# Patient Record
Sex: MALE | NOT HISPANIC OR LATINO | Employment: FULL TIME | URBAN - METROPOLITAN AREA
[De-identification: names, ages, dates, MRNs, and addresses within clinical notes are randomized per-mention and may not be internally consistent; named-entity substitution may affect disease eponyms.]

---

## 2024-05-20 ENCOUNTER — HOSPITAL ENCOUNTER (OUTPATIENT)
Dept: RADIOLOGY | Facility: HOSPITAL | Age: 69
Discharge: HOME | End: 2024-05-20
Payer: COMMERCIAL

## 2024-05-20 VITALS — WEIGHT: 210.1 LBS

## 2024-05-20 DIAGNOSIS — R97.20 ELEVATED PROSTATE SPECIFIC ANTIGEN (PSA): ICD-10-CM

## 2024-05-20 PROCEDURE — A9575 INJ GADOTERATE MEGLUMI 0.1ML: HCPCS | Performed by: UROLOGY

## 2024-05-20 PROCEDURE — 72197 MRI PELVIS W/O & W/DYE: CPT

## 2024-05-20 PROCEDURE — 2550000001 HC RX 255 CONTRASTS: Performed by: UROLOGY

## 2024-05-20 PROCEDURE — 72197 MRI PELVIS W/O & W/DYE: CPT | Performed by: RADIOLOGY

## 2024-05-20 RX ORDER — GADOTERATE MEGLUMINE 376.9 MG/ML
19 INJECTION INTRAVENOUS
Status: COMPLETED | OUTPATIENT
Start: 2024-05-20 | End: 2024-05-20

## 2024-05-20 RX ADMIN — GADOTERATE MEGLUMINE 19 ML: 376.9 INJECTION INTRAVENOUS at 11:39

## 2024-06-17 ENCOUNTER — LAB (OUTPATIENT)
Dept: LAB | Facility: LAB | Age: 69
End: 2024-06-17
Payer: COMMERCIAL

## 2024-06-17 DIAGNOSIS — D64.9 ANEMIA, UNSPECIFIED: Primary | ICD-10-CM

## 2024-06-17 LAB
FERRITIN SERPL-MCNC: 24 NG/ML (ref 20–300)
IRON SATN MFR SERPL: 18 % (ref 25–45)
IRON SERPL-MCNC: 62 UG/DL (ref 35–150)
TIBC SERPL-MCNC: 343 UG/DL (ref 240–445)
UIBC SERPL-MCNC: 281 UG/DL (ref 110–370)

## 2024-06-17 PROCEDURE — 82728 ASSAY OF FERRITIN: CPT

## 2024-06-17 PROCEDURE — 83550 IRON BINDING TEST: CPT

## 2024-06-17 PROCEDURE — 36415 COLL VENOUS BLD VENIPUNCTURE: CPT

## 2024-06-17 PROCEDURE — 83540 ASSAY OF IRON: CPT

## 2024-08-08 ENCOUNTER — APPOINTMENT (OUTPATIENT)
Dept: UROLOGY | Facility: CLINIC | Age: 69
End: 2024-08-08
Payer: COMMERCIAL

## 2024-08-08 VITALS — HEIGHT: 68 IN | BODY MASS INDEX: 30.31 KG/M2 | TEMPERATURE: 96.6 F | WEIGHT: 200 LBS

## 2024-08-08 DIAGNOSIS — R39.12 BENIGN PROSTATIC HYPERPLASIA WITH WEAK URINARY STREAM: ICD-10-CM

## 2024-08-08 DIAGNOSIS — R97.20 ELEVATED PSA: ICD-10-CM

## 2024-08-08 DIAGNOSIS — R33.9 URINARY RETENTION: ICD-10-CM

## 2024-08-08 DIAGNOSIS — N40.1 BENIGN PROSTATIC HYPERPLASIA WITH WEAK URINARY STREAM: ICD-10-CM

## 2024-08-08 DIAGNOSIS — Z79.2 PROPHYLACTIC ANTIBIOTIC: ICD-10-CM

## 2024-08-08 DIAGNOSIS — N39.0 URINARY TRACT INFECTION WITHOUT HEMATURIA, SITE UNSPECIFIED: Primary | ICD-10-CM

## 2024-08-08 LAB
POC APPEARANCE, URINE: CLEAR
POC BILIRUBIN, URINE: NEGATIVE
POC BLOOD, URINE: NEGATIVE
POC COLOR, URINE: YELLOW
POC GLUCOSE, URINE: NEGATIVE MG/DL
POC KETONES, URINE: NEGATIVE MG/DL
POC LEUKOCYTES, URINE: ABNORMAL
POC NITRITE,URINE: NEGATIVE
POC PH, URINE: 6 PH
POC PROTEIN, URINE: NEGATIVE MG/DL
POC SPECIFIC GRAVITY, URINE: 1.02
POC UROBILINOGEN, URINE: 0.2 EU/DL

## 2024-08-08 PROCEDURE — 1126F AMNT PAIN NOTED NONE PRSNT: CPT | Performed by: UROLOGY

## 2024-08-08 PROCEDURE — G2211 COMPLEX E/M VISIT ADD ON: HCPCS | Performed by: UROLOGY

## 2024-08-08 PROCEDURE — 99214 OFFICE O/P EST MOD 30 MIN: CPT | Performed by: UROLOGY

## 2024-08-08 PROCEDURE — 1159F MED LIST DOCD IN RCRD: CPT | Performed by: UROLOGY

## 2024-08-08 PROCEDURE — 81002 URINALYSIS NONAUTO W/O SCOPE: CPT | Performed by: UROLOGY

## 2024-08-08 PROCEDURE — 3008F BODY MASS INDEX DOCD: CPT | Performed by: UROLOGY

## 2024-08-08 PROCEDURE — 76857 US EXAM PELVIC LIMITED: CPT | Performed by: UROLOGY

## 2024-08-08 RX ORDER — IBUPROFEN 100 MG/5ML
1000 SUSPENSION, ORAL (FINAL DOSE FORM) ORAL
COMMUNITY

## 2024-08-08 RX ORDER — SILDENAFIL 50 MG/1
TABLET, FILM COATED ORAL
COMMUNITY

## 2024-08-08 RX ORDER — CHOLECALCIFEROL (VITAMIN D3) 25 MCG
1 TABLET ORAL DAILY
COMMUNITY
Start: 2023-06-11

## 2024-08-08 ASSESSMENT — PAIN SCALES - GENERAL: PAINLEVEL: 0-NO PAIN

## 2024-08-08 NOTE — PROGRESS NOTES
"  Patient is a 69 y.o. male with a history of an elevated PSA  Chief Complaint    Follow-up; prostate biopsy        Referring physician: No ref. provider found     SUBJECTIVE:  HPI   His PSA in 5/2024 was 4.81.  MRI prostate in 5/2024 was negative.    No results found for: \"KPSAT\", \"KPSAP\"  No results found for: \"UAMICCOMM\"  No results found for: \"TR1\"  No results found for: \"URINECULTURE\"     Past Medical History:   Diagnosis Date    Cramp and spasm 06/02/2014    Muscle cramps    Other conditions influencing health status     Fracture Of The Lower Jaw (Closed)    Personal history of diseases of the skin and subcutaneous tissue 01/05/2015    History of urticaria    Personal history of other diseases of the musculoskeletal system and connective tissue 11/12/2013    History of tendinitis    Personal history of other diseases of the respiratory system     History of bronchitis      Past Surgical History:   Procedure Laterality Date    APPENDECTOMY  11/12/2013    Appendectomy    COLONOSCOPY  10/29/2015    Complete Colonoscopy      No family history on file.   Social History     Socioeconomic History    Marital status:    Tobacco Use    Smoking status: Never    Smokeless tobacco: Never     Social Determinants of Health     Financial Resource Strain: Not on File (3/6/2021)    Received from [facility]    Financial Resource Strain     Financial Resource Strain: 0   Food Insecurity: Not on File (3/6/2021)    Received from [facility]    Food Insecurity     Food: 0   Transportation Needs: Not on File (3/6/2021)    Received from [facility]    Transportation Needs     Transportation: 0   Physical Activity: Not on File (3/6/2021)    Received from [facility]    Physical Activity     Physical Activity: 0   Stress: Not on File (3/6/2021)    Received from [facility]    Stress     Stress: 0   Social Connections: Not on File (3/6/2021)    Received from [facility]    Social Connections     Social Connections and " Isolation: 0   Housing Stability: Not on File (3/6/2021)    Received from [facility]    Housing Stability     Housin        Review of Systems   Constitutional: denies any unintentional weight loss or change in strength.  Integumentary: denies any rashes or pruritus.  Eyes: denies any double vision or eye pain.  Ear/Nose/Mouth/Throat: denies any nosebleeds or gum bleeds.  Cardiovascular: denies any chest pain or syncope.  Respiratory: denies hemoptysis.  Gastrointestinal: denies nausea or vomiting.  Musculoskeletal: denies muscle cramping or weakness.  Neurologic: denies convulsions or seizures.  Hematologic/Lymphatic: denies bleeding tendencies.  Endocrine: denies heat/cold intolerance.  All other systems have been reviewed and are negative unless otherwise noted in the HPI.    OBJECTIVE:  Visit Vitals  Temp 35.9 °C (96.6 °F)     Physical Exam   Constitutional: No obvious distress.  Eyes: Non-injected conjunctiva, sclera clear, EOMI.  Ears/Nose/Mouth/Throat: No obvious drainage per ears or nose.  Cardiovascular: Extremities are warm and well perfused. No edema, cyanosis or pallor.  Respiratory: No audible wheezing/stridor; respirations do not appear labored.  Gastrointestinal: Abdomen soft, not distended.  Musculoskeletal: Normal ROM of extremities.  Skin: No obvious rashes or open sores.  Neurologic: Alert and oriented, CN 2-12 grossly intact.  Psychiatric: Answers questions appropriately with normal affect.  Hematologic/Lymphatic/Immunologic: No obvious bruises or sites of spontaneous bleeding.  Genitourinary: No CVA tenderness, bladder not palpable.     Labs:  Lab Results   Component Value Date    WBC 7.7 10/21/2022    HGB 14.2 10/21/2022    HCT 41.0 10/21/2022     10/21/2022    CHOL 210 (H) 2018    TRIG 91 2018    HDL 48 2018    ALT 23 10/21/2022    AST 32 10/21/2022     10/21/2022    K 3.7 10/21/2022     10/21/2022    CREATININE 1.58 (H) 10/21/2022    BUN 18 10/21/2022  "   CO2 28 10/21/2022    TSH 3.28 12/28/2018    HGBA1C 6.5 (H) 05/14/2024     No results found for: \"KPSAT\", \"KPSAP\"  IMAGING:        PROCEDURES:    Bladder scan performed with ultrasound imaging.  Post void residual 65 cc      ASSESSMENT/PLAN:  Problem List Items Addressed This Visit       BPH (benign prostatic hyperplasia)    Elevated PSA    Urinary retention     Other Visit Diagnoses       Urinary tract infection without hematuria, site unspecified    -  Primary    Relevant Orders    POCT UA (nonautomated) manually resulted (Completed)    Measure post void residual (Completed)    Prophylactic antibiotic        Relevant Medications    levoFLOXacin (Levaquin) 500 mg tablet           This history of an elevated PSA.  We reviewed his negative MRI and prior PSA level.  He elected for a prostate biopsy and will be scheduled.  He will  an enema and was prescribed Levaquin to start prior to procedure.    He has a history of BPH.  He is voiding will be monitored.    He has mild urinary retention and has a prior history of retention.  His postvoid residual was 65 cc today.  This will be followed with bladder scan.    All questions were answered to the patient’s satisfaction.  Patient agrees with the plan and wishes to proceed.  Follow-up will be scheduled appropriately.     Jayjay Carvajal MD  "

## 2024-08-11 DIAGNOSIS — R82.81 PYURIA: Primary | ICD-10-CM

## 2024-08-11 PROBLEM — N40.0 BPH (BENIGN PROSTATIC HYPERPLASIA): Status: ACTIVE | Noted: 2024-08-11

## 2024-08-11 PROBLEM — R97.20 ELEVATED PSA: Status: ACTIVE | Noted: 2024-08-11

## 2024-08-11 PROBLEM — R33.9 URINARY RETENTION: Status: ACTIVE | Noted: 2024-08-11

## 2024-08-11 RX ORDER — LEVOFLOXACIN 500 MG/1
500 TABLET, FILM COATED ORAL DAILY
Qty: 2 TABLET | Refills: 0 | Status: SHIPPED | OUTPATIENT
Start: 2024-08-11 | End: 2024-08-13

## 2024-08-19 ENCOUNTER — LAB (OUTPATIENT)
Dept: LAB | Facility: LAB | Age: 69
End: 2024-08-19
Payer: COMMERCIAL

## 2024-08-19 DIAGNOSIS — R82.81 PYURIA: ICD-10-CM

## 2024-08-19 PROCEDURE — 87086 URINE CULTURE/COLONY COUNT: CPT

## 2024-08-19 PROCEDURE — 87181 SC STD AGAR DILUTION PER AGT: CPT

## 2024-08-19 PROCEDURE — 81001 URINALYSIS AUTO W/SCOPE: CPT

## 2024-08-20 LAB
APPEARANCE UR: ABNORMAL
BILIRUB UR STRIP.AUTO-MCNC: NEGATIVE MG/DL
COLOR UR: ABNORMAL
GLUCOSE UR STRIP.AUTO-MCNC: NORMAL MG/DL
HOLD SPECIMEN: NORMAL
KETONES UR STRIP.AUTO-MCNC: NEGATIVE MG/DL
LEUKOCYTE ESTERASE UR QL STRIP.AUTO: ABNORMAL
MUCOUS THREADS #/AREA URNS AUTO: ABNORMAL /LPF
NITRITE UR QL STRIP.AUTO: NEGATIVE
PH UR STRIP.AUTO: 5.5 [PH]
PROT UR STRIP.AUTO-MCNC: ABNORMAL MG/DL
RBC # UR STRIP.AUTO: NEGATIVE /UL
RBC #/AREA URNS AUTO: ABNORMAL /HPF
SP GR UR STRIP.AUTO: 1.02
SQUAMOUS #/AREA URNS AUTO: ABNORMAL /HPF
UROBILINOGEN UR STRIP.AUTO-MCNC: NORMAL MG/DL
WBC #/AREA URNS AUTO: >50 /HPF

## 2024-08-22 ENCOUNTER — APPOINTMENT (OUTPATIENT)
Dept: UROLOGY | Facility: CLINIC | Age: 69
End: 2024-08-22
Payer: COMMERCIAL

## 2024-08-22 VITALS — HEIGHT: 68 IN | BODY MASS INDEX: 30.46 KG/M2 | WEIGHT: 201 LBS | TEMPERATURE: 97 F

## 2024-08-22 DIAGNOSIS — N39.0 URINARY TRACT INFECTION WITHOUT HEMATURIA, SITE UNSPECIFIED: ICD-10-CM

## 2024-08-22 DIAGNOSIS — N30.00 ACUTE CYSTITIS WITHOUT HEMATURIA: Primary | ICD-10-CM

## 2024-08-22 DIAGNOSIS — R97.20 ELEVATED PSA: ICD-10-CM

## 2024-08-22 LAB — BACTERIA UR CULT: ABNORMAL

## 2024-08-22 PROCEDURE — 99213 OFFICE O/P EST LOW 20 MIN: CPT | Performed by: UROLOGY

## 2024-08-22 PROCEDURE — G2211 COMPLEX E/M VISIT ADD ON: HCPCS | Performed by: UROLOGY

## 2024-08-22 RX ORDER — LEVOFLOXACIN 500 MG/1
500 TABLET, FILM COATED ORAL DAILY
Qty: 9 TABLET | Refills: 0 | Status: SHIPPED | OUTPATIENT
Start: 2024-08-22 | End: 2024-09-01

## 2024-08-22 ASSESSMENT — PAIN SCALES - GENERAL: PAINLEVEL: 0-NO PAIN

## 2024-08-22 NOTE — PROGRESS NOTES
Patient is a 69 y.o. male presenting with an elevated psa     SUBJECTIVE:  HPI   His urine culture was positive for a UTI.  He was scheduled for a prostate biopsy today.      Urine Culture (no units)   Date Value   2024 >100,000 Aerococcus urinae (A)        Past Medical History:   Diagnosis Date    Cramp and spasm 2014    Muscle cramps    Other conditions influencing health status     Fracture Of The Lower Jaw (Closed)    Personal history of diseases of the skin and subcutaneous tissue 2015    History of urticaria    Personal history of other diseases of the musculoskeletal system and connective tissue 2013    History of tendinitis    Personal history of other diseases of the respiratory system     History of bronchitis      Past Surgical History:   Procedure Laterality Date    APPENDECTOMY  2013    Appendectomy    COLONOSCOPY  10/29/2015    Complete Colonoscopy      No family history on file.   Social History     Socioeconomic History    Marital status:    Tobacco Use    Smoking status: Never    Smokeless tobacco: Never     Social Determinants of Health     Financial Resource Strain: Not on File (3/6/2021)    Received from [facility]    Financial Resource Strain     Financial Resource Strain: 0   Food Insecurity: Not on File (3/6/2021)    Received from [facility]    Food Insecurity     Food: 0   Transportation Needs: Not on File (3/6/2021)    Received from [facility]    Transportation Needs     Transportation: 0   Physical Activity: Not on File (3/6/2021)    Received from [facility]    Physical Activity     Physical Activity: 0   Stress: Not on File (3/6/2021)    Received from [facility]    Stress     Stress: 0   Social Connections: Not on File (3/6/2021)    Received from [facility]    Social Connections     Social Connections and Isolation: 0   Housing Stability: Not on File (3/6/2021)    Received from [facility]    Housing Stability     Housin        Review  "of Systems   Constitutional: denies any unintentional weight loss or change in strength.  Integumentary: denies any rashes or pruritus.  Eyes: denies any double vision or eye pain.  Ear/Nose/Mouth/Throat: denies any nosebleeds or gum bleeds.  Cardiovascular: denies any chest pain or syncope.  Respiratory: denies hemoptysis.  Gastrointestinal: denies nausea or vomiting.  Musculoskeletal: denies muscle cramping or weakness.  Neurologic: denies convulsions or seizures.  Hematologic/Lymphatic: denies bleeding tendencies.  Endocrine: denies heat/cold intolerance.  All other systems have been reviewed and are negative unless otherwise noted in the HPI.    OBJECTIVE:  Visit Vitals  Temp 36.1 °C (97 °F)     Physical Exam   Constitutional: No obvious distress.  Eyes: Non-injected conjunctiva, sclera clear, EOMI.  Ears/Nose/Mouth/Throat: No obvious drainage per ears or nose.  Cardiovascular: Extremities are warm and well perfused. No edema, cyanosis or pallor.  Respiratory: No audible wheezing/stridor; respirations do not appear labored.  Musculoskeletal: Normal ROM of extremities.  Skin: No obvious rashes or open sores.  Neurologic: Alert and oriented, CN 2-12 grossly intact.  Psychiatric: Answers questions appropriately with normal affect.  Hematologic/Lymphatic/Immunologic: No obvious bruises or sites of spontaneous bleeding.    Labs:  Lab Results   Component Value Date    WBC 7.7 10/21/2022    HGB 14.2 10/21/2022    HCT 41.0 10/21/2022     10/21/2022    CHOL 210 (H) 12/28/2018    TRIG 91 12/28/2018    HDL 48 12/28/2018    ALT 23 10/21/2022    AST 32 10/21/2022     10/21/2022    K 3.7 10/21/2022     10/21/2022    CREATININE 1.58 (H) 10/21/2022    BUN 18 10/21/2022    CO2 28 10/21/2022    TSH 3.28 12/28/2018    HGBA1C 6.5 (H) 05/14/2024     No results found for: \"KPSAT\", \"KPSAP\"  IMAGING:        PROCEDURES:    ASSESSMENT/PLAN:  Problem List Items Addressed This Visit       Elevated PSA    Acute cystitis " without hematuria - Primary     Other Visit Diagnoses       Urinary tract infection without hematuria, site unspecified        Relevant Medications    levoFLOXacin (Levaquin) 500 mg tablet    Other Relevant Orders    Urinalysis with Reflex Culture and Microscopic          He has a UTI and has started Levaquin.  He will complete 10 days of Levaquin and repeat a urinalysis.  His prostate biopsy will be rescheduled for 3 weeks from now.    He has a history of an elevated PSA.  His MRI was negative.    He has a history of BPH.  His prostate is 48.8 g.  He is voiding will be monitored.    He has mild urinary retention and has a prior history of retention.  His postvoid residual was 65 ml. This will be followed with bladder scan.      All questions were answered to the patient’s satisfaction.  Patient agrees with the plan and wishes to proceed.  Follow-up will be scheduled appropriately.     Jayjay Carvajal MD

## 2024-08-22 NOTE — PATIENT INSTRUCTIONS
Your provider has ordered lab work.  Please obtain your labs at any  facility, after you finish your course of antibiotics.

## 2024-09-02 ENCOUNTER — HOSPITAL ENCOUNTER (OUTPATIENT)
Dept: RADIOLOGY | Facility: EXTERNAL LOCATION | Age: 69
Discharge: HOME | End: 2024-09-02
Payer: COMMERCIAL

## 2024-09-02 DIAGNOSIS — M79.671 RIGHT FOOT PAIN: ICD-10-CM

## 2024-09-19 ENCOUNTER — APPOINTMENT (OUTPATIENT)
Dept: UROLOGY | Facility: CLINIC | Age: 69
End: 2024-09-19
Payer: COMMERCIAL

## 2024-10-07 ENCOUNTER — LAB (OUTPATIENT)
Dept: LAB | Facility: LAB | Age: 69
End: 2024-10-07
Payer: COMMERCIAL

## 2024-10-07 DIAGNOSIS — R82.81 PYURIA: ICD-10-CM

## 2024-10-07 LAB
APPEARANCE UR: ABNORMAL
BILIRUB UR STRIP.AUTO-MCNC: NEGATIVE MG/DL
COLOR UR: YELLOW
GLUCOSE UR STRIP.AUTO-MCNC: NORMAL MG/DL
HOLD SPECIMEN: NORMAL
KETONES UR STRIP.AUTO-MCNC: NEGATIVE MG/DL
LEUKOCYTE ESTERASE UR QL STRIP.AUTO: ABNORMAL
MUCOUS THREADS #/AREA URNS AUTO: ABNORMAL /LPF
NITRITE UR QL STRIP.AUTO: NEGATIVE
PH UR STRIP.AUTO: 5.5 [PH]
PROT UR STRIP.AUTO-MCNC: ABNORMAL MG/DL
RBC # UR STRIP.AUTO: NEGATIVE /UL
RBC #/AREA URNS AUTO: ABNORMAL /HPF
SP GR UR STRIP.AUTO: 1.03
SQUAMOUS #/AREA URNS AUTO: ABNORMAL /HPF
UROBILINOGEN UR STRIP.AUTO-MCNC: NORMAL MG/DL
WBC #/AREA URNS AUTO: >50 /HPF
WBC CLUMPS #/AREA URNS AUTO: ABNORMAL /HPF

## 2024-10-07 PROCEDURE — 81001 URINALYSIS AUTO W/SCOPE: CPT

## 2024-10-07 PROCEDURE — 87086 URINE CULTURE/COLONY COUNT: CPT

## 2024-10-07 PROCEDURE — 87077 CULTURE AEROBIC IDENTIFY: CPT

## 2024-10-10 ENCOUNTER — APPOINTMENT (OUTPATIENT)
Dept: UROLOGY | Facility: CLINIC | Age: 69
End: 2024-10-10
Payer: COMMERCIAL

## 2024-10-10 DIAGNOSIS — N39.0 URINARY TRACT INFECTION WITHOUT HEMATURIA, SITE UNSPECIFIED: ICD-10-CM

## 2024-10-10 LAB
BACTERIA UR CULT: ABNORMAL
POC APPEARANCE, URINE: CLEAR
POC BILIRUBIN, URINE: NEGATIVE
POC BLOOD, URINE: NEGATIVE
POC COLOR, URINE: YELLOW
POC GLUCOSE, URINE: NEGATIVE MG/DL
POC KETONES, URINE: ABNORMAL MG/DL
POC LEUKOCYTES, URINE: ABNORMAL
POC NITRITE,URINE: NEGATIVE
POC PH, URINE: 5.5 PH
POC PROTEIN, URINE: NEGATIVE MG/DL
POC SPECIFIC GRAVITY, URINE: 1.02
POC UROBILINOGEN, URINE: 0.2 EU/DL

## 2024-10-10 RX ORDER — DOXYCYCLINE HYCLATE 100 MG
100 TABLET ORAL 2 TIMES DAILY
Qty: 28 TABLET | Refills: 0 | Status: SHIPPED | OUTPATIENT
Start: 2024-10-10 | End: 2024-10-24

## 2024-10-14 DIAGNOSIS — N30.00 ACUTE CYSTITIS WITHOUT HEMATURIA: Primary | ICD-10-CM

## 2024-10-14 RX ORDER — CIPROFLOXACIN 500 MG/1
500 TABLET ORAL 2 TIMES DAILY
Qty: 14 TABLET | Refills: 0 | Status: SHIPPED | OUTPATIENT
Start: 2024-10-14 | End: 2024-10-21

## 2024-10-17 ENCOUNTER — APPOINTMENT (OUTPATIENT)
Dept: UROLOGY | Facility: CLINIC | Age: 69
End: 2024-10-17
Payer: COMMERCIAL

## 2024-10-17 VITALS — HEIGHT: 68 IN | BODY MASS INDEX: 29.25 KG/M2 | WEIGHT: 193 LBS | TEMPERATURE: 96.9 F

## 2024-10-17 DIAGNOSIS — R97.20 ELEVATED PSA: ICD-10-CM

## 2024-10-17 DIAGNOSIS — N40.1 BENIGN PROSTATIC HYPERPLASIA WITH WEAK URINARY STREAM: Primary | ICD-10-CM

## 2024-10-17 DIAGNOSIS — R39.12 BENIGN PROSTATIC HYPERPLASIA WITH WEAK URINARY STREAM: Primary | ICD-10-CM

## 2024-10-17 DIAGNOSIS — N30.00 ACUTE CYSTITIS WITHOUT HEMATURIA: ICD-10-CM

## 2024-10-17 DIAGNOSIS — N20.0 KIDNEY STONE: ICD-10-CM

## 2024-10-17 LAB
POC APPEARANCE, URINE: CLEAR
POC BILIRUBIN, URINE: NEGATIVE
POC BLOOD, URINE: NEGATIVE
POC COLOR, URINE: YELLOW
POC GLUCOSE, URINE: NEGATIVE MG/DL
POC KETONES, URINE: NEGATIVE MG/DL
POC LEUKOCYTES, URINE: NEGATIVE
POC NITRITE,URINE: NEGATIVE
POC PH, URINE: 5.5 PH
POC PROTEIN, URINE: NEGATIVE MG/DL
POC SPECIFIC GRAVITY, URINE: 1.02
POC UROBILINOGEN, URINE: 0.2 EU/DL

## 2024-10-17 PROCEDURE — G2211 COMPLEX E/M VISIT ADD ON: HCPCS | Performed by: UROLOGY

## 2024-10-17 PROCEDURE — 81003 URINALYSIS AUTO W/O SCOPE: CPT | Performed by: UROLOGY

## 2024-10-17 PROCEDURE — 99214 OFFICE O/P EST MOD 30 MIN: CPT | Performed by: UROLOGY

## 2024-10-17 ASSESSMENT — PAIN SCALES - GENERAL: PAINLEVEL_OUTOF10: 0-NO PAIN

## 2024-10-17 NOTE — PROGRESS NOTES
Patient is a 69 y.o. male presenting with an elevated psa and UTI.    SUBJECTIVE:  HPI He continues doxycycline for Aerococcus urinary infection.  He is scheduling a CT scan to evaluate for stones.  He has a prior history of stones and has had recurrent UTI.      Urine Culture (no units)   Date Value   10/07/2024 20,000 - 80,000 Aerococcus urinae (A)        Past Medical History:   Diagnosis Date    Cramp and spasm 06/02/2014    Muscle cramps    Other conditions influencing health status     Fracture Of The Lower Jaw (Closed)    Personal history of diseases of the skin and subcutaneous tissue 01/05/2015    History of urticaria    Personal history of other diseases of the musculoskeletal system and connective tissue 11/12/2013    History of tendinitis    Personal history of other diseases of the respiratory system     History of bronchitis      Past Surgical History:   Procedure Laterality Date    APPENDECTOMY  11/12/2013    Appendectomy    COLONOSCOPY  10/29/2015    Complete Colonoscopy      No family history on file.   Social History     Socioeconomic History    Marital status:    Tobacco Use    Smoking status: Never    Smokeless tobacco: Never     Social Drivers of Health     Financial Resource Strain: Not on File (3/6/2021)    Received from [facility]    Financial Resource Strain     Financial Resource Strain: 0   Food Insecurity: Not on File (3/6/2021)    Received from [facility]    Food Insecurity     Food: 0   Transportation Needs: Not on File (3/6/2021)    Received from [facility]    Transportation Needs     Transportation: 0   Physical Activity: Not on File (3/6/2021)    Received from [facility]    Physical Activity     Physical Activity: 0   Stress: Not on File (3/6/2021)    Received from [facility]    Stress     Stress: 0   Social Connections: Not on File (3/6/2021)    Received from [facility]    Social Connections     Social Connections and Isolation: 0   Housing Stability: Not on File  "(3/6/2021)    Received from [facility]    Housing Stability     Housin        Review of Systems   Constitutional: denies any unintentional weight loss or change in strength.  Integumentary: denies any rashes or pruritus.  Eyes: denies any double vision or eye pain.  Ear/Nose/Mouth/Throat: denies any nosebleeds or gum bleeds.  Cardiovascular: denies any chest pain or syncope.  Respiratory: denies hemoptysis.  Gastrointestinal: denies nausea or vomiting.  Musculoskeletal: denies muscle cramping or weakness.  Neurologic: denies convulsions or seizures.  Hematologic/Lymphatic: denies bleeding tendencies.  Endocrine: denies heat/cold intolerance.  All other systems have been reviewed and are negative unless otherwise noted in the HPI.    OBJECTIVE:  Visit Vitals  Temp 36.1 °C (96.9 °F)       Physical Exam   Constitutional: No obvious distress.  Eyes: Non-injected conjunctiva, sclera clear, EOMI.  Ears/Nose/Mouth/Throat: No obvious drainage per ears or nose.  Cardiovascular: Extremities are warm and well perfused. No edema, cyanosis or pallor.  Respiratory: No audible wheezing/stridor; respirations do not appear labored.  Musculoskeletal: Normal ROM of extremities.  Skin: No obvious rashes or open sores.  Neurologic: Alert and oriented, CN 2-12 grossly intact.  Psychiatric: Answers questions appropriately with normal affect.  Hematologic/Lymphatic/Immunologic: No obvious bruises or sites of spontaneous bleeding.    Labs:  Lab Results   Component Value Date    WBC 7.7 10/21/2022    HGB 14.2 10/21/2022    HCT 41.0 10/21/2022     10/21/2022    CHOL 210 (H) 2018    TRIG 91 2018    HDL 48 2018    ALT 23 10/21/2022    AST 32 10/21/2022     10/21/2022    K 3.7 10/21/2022     10/21/2022    CREATININE 1.58 (H) 10/21/2022    BUN 18 10/21/2022    CO2 28 10/21/2022    TSH 3.28 2018    HGBA1C 6.5 (H) 2024     No results found for: \"KPSAT\", \"KPSAP\"  IMAGING:    "     PROCEDURES:    ASSESSMENT/PLAN:  Problem List Items Addressed This Visit       BPH (benign prostatic hyperplasia) - Primary    Relevant Orders    POCT UA Automated manually resulted (Completed)    Elevated PSA    Acute cystitis without hematuria    Kidney stone         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  CT scan has been ordered to evaluate for an etiology for his continued infection.  He is finishing another week of doxycycline.    Prostate biopsy was again reviewed and he elects for the procedure next week.  He will take a dose of Cipro prior to the procedure.    He has a history of BPH.  His prostate is 48.8 g.  He is voiding will be monitored.    He has mild urinary retention and has a prior history of retention.  His prior postvoid residual was 65 ml. This will be followed with bladder scan.      All questions were answered to the patient’s satisfaction.  Patient agrees with the plan and wishes to proceed.  Follow-up will be scheduled appropriately.     Jayjay Carvajal MD

## 2024-10-24 ENCOUNTER — APPOINTMENT (OUTPATIENT)
Dept: UROLOGY | Facility: CLINIC | Age: 69
End: 2024-10-24
Payer: COMMERCIAL

## 2024-10-24 VITALS
HEIGHT: 68 IN | SYSTOLIC BLOOD PRESSURE: 145 MMHG | WEIGHT: 197 LBS | BODY MASS INDEX: 29.86 KG/M2 | DIASTOLIC BLOOD PRESSURE: 82 MMHG

## 2024-10-24 DIAGNOSIS — R97.20 ELEVATED PROSTATE SPECIFIC ANTIGEN (PSA): ICD-10-CM

## 2024-10-24 DIAGNOSIS — N30.00 ACUTE CYSTITIS WITHOUT HEMATURIA: ICD-10-CM

## 2024-10-24 DIAGNOSIS — R97.20 ELEVATED PSA: Primary | ICD-10-CM

## 2024-10-24 LAB
POC APPEARANCE, URINE: CLEAR
POC BILIRUBIN, URINE: NEGATIVE
POC BLOOD, URINE: NEGATIVE
POC COLOR, URINE: YELLOW
POC GLUCOSE, URINE: NEGATIVE MG/DL
POC KETONES, URINE: NEGATIVE MG/DL
POC LEUKOCYTES, URINE: NEGATIVE
POC NITRITE,URINE: NEGATIVE
POC PH, URINE: 5.5 PH
POC PROTEIN, URINE: NEGATIVE MG/DL
POC SPECIFIC GRAVITY, URINE: >=1.03
POC UROBILINOGEN, URINE: 0.2 EU/DL

## 2024-10-24 PROCEDURE — 88305 TISSUE EXAM BY PATHOLOGIST: CPT

## 2024-10-24 PROCEDURE — 76872 US TRANSRECTAL: CPT | Performed by: UROLOGY

## 2024-10-24 PROCEDURE — 81003 URINALYSIS AUTO W/O SCOPE: CPT | Performed by: UROLOGY

## 2024-10-24 PROCEDURE — 76942 ECHO GUIDE FOR BIOPSY: CPT | Performed by: UROLOGY

## 2024-10-24 PROCEDURE — 55700 PR PROSTATE NEEDLE BIOPSY ANY APPROACH: CPT | Performed by: UROLOGY

## 2024-10-24 PROCEDURE — 88305 TISSUE EXAM BY PATHOLOGIST: CPT | Performed by: STUDENT IN AN ORGANIZED HEALTH CARE EDUCATION/TRAINING PROGRAM

## 2024-10-24 RX ORDER — ATORVASTATIN CALCIUM 40 MG/1
40 TABLET, FILM COATED ORAL
COMMUNITY
Start: 2024-06-04

## 2024-10-24 RX ORDER — CEPHALEXIN 500 MG/1
CAPSULE ORAL EVERY 12 HOURS
COMMUNITY
Start: 2020-05-08

## 2024-10-24 RX ORDER — DOXYCYCLINE 100 MG/1
1 TABLET ORAL 2 TIMES DAILY
COMMUNITY
Start: 2020-05-28

## 2024-10-24 RX ORDER — MELOXICAM 7.5 MG/1
1 TABLET ORAL 2 TIMES DAILY PRN
COMMUNITY
Start: 2021-09-09

## 2024-10-24 RX ORDER — METHYLPREDNISOLONE 4 MG/1
TABLET ORAL
COMMUNITY
Start: 2021-09-09

## 2024-10-24 RX ORDER — IBUPROFEN 600 MG/1
1 TABLET ORAL 3 TIMES DAILY PRN
COMMUNITY
Start: 2018-06-19

## 2024-10-24 ASSESSMENT — PAIN SCALES - GENERAL: PAINLEVEL_OUTOF10: 2

## 2024-10-25 ENCOUNTER — APPOINTMENT (OUTPATIENT)
Dept: RADIOLOGY | Facility: CLINIC | Age: 69
End: 2024-10-25
Payer: COMMERCIAL

## 2024-10-25 ENCOUNTER — HOSPITAL ENCOUNTER (OUTPATIENT)
Dept: RADIOLOGY | Facility: CLINIC | Age: 69
Discharge: HOME | End: 2024-10-25
Payer: COMMERCIAL

## 2024-10-25 DIAGNOSIS — N20.0 KIDNEY STONES: ICD-10-CM

## 2024-10-25 PROCEDURE — 74176 CT ABD & PELVIS W/O CONTRAST: CPT

## 2024-11-03 PROBLEM — R97.20 ELEVATED PSA: Status: RESOLVED | Noted: 2024-08-11 | Resolved: 2024-11-03

## 2024-11-05 LAB
LABORATORY COMMENT REPORT: NORMAL
PATH REPORT.FINAL DX SPEC: NORMAL
PATH REPORT.GROSS SPEC: NORMAL
PATH REPORT.RELEVANT HX SPEC: NORMAL
PATH REPORT.TOTAL CANCER: NORMAL

## 2024-12-04 NOTE — PROGRESS NOTES
Patient is a 69 y.o. male presenting with an elevated psa.    SUBJECTIVE:  He reports urinary frequency today. He is not currently on any medications. We had detailed discussion for the use of tamsulosin and alfuzosin. He will try alfuzosin.  He underwent prostate biopsy 10/24/2024.    He had a CT scan 10/25/2024  He has history of Aerococcus urinary infection treated with doxycycline.  He has a prior history of stones and has had recurrent UTI.      Urine Culture (no units)   Date Value   10/07/2024 20,000 - 80,000 Aerococcus urinae (A)        Past Medical History:   Diagnosis Date    Cramp and spasm 06/02/2014    Muscle cramps    Other conditions influencing health status     Fracture Of The Lower Jaw (Closed)    Personal history of diseases of the skin and subcutaneous tissue 01/05/2015    History of urticaria    Personal history of other diseases of the musculoskeletal system and connective tissue 11/12/2013    History of tendinitis    Personal history of other diseases of the respiratory system     History of bronchitis      Past Surgical History:   Procedure Laterality Date    APPENDECTOMY  11/12/2013    Appendectomy    COLONOSCOPY  10/29/2015    Complete Colonoscopy      No family history on file.   Social History     Socioeconomic History    Marital status:    Tobacco Use    Smoking status: Never    Smokeless tobacco: Never     Social Drivers of Health     Financial Resource Strain: Not on File (3/6/2021)    Received from [facility]    Financial Resource Strain     Financial Resource Strain: 0   Food Insecurity: Not on File (3/6/2021)    Received from [facility]    Food Insecurity     Food: 0   Transportation Needs: Not on File (3/6/2021)    Received from [facility]    Transportation Needs     Transportation: 0   Physical Activity: Not on File (3/6/2021)    Received from [facility]    Physical Activity     Physical Activity: 0   Stress: Not on File (3/6/2021)    Received from [facility]     "Stress     Stress: 0   Social Connections: Not on File (3/6/2021)    Received from [facility]    Social Connections     Social Connections and Isolation: 0   Housing Stability: Not on File (3/6/2021)    Received from [facility]    Housing Stability     Housin      OBJECTIVE:  Visit Vitals  Temp 36.2 °C (97.1 °F)     Physical Exam   Constitutional: No obvious distress.  Eyes: Non-injected conjunctiva, sclera clear, EOMI.  Ears/Nose/Mouth/Throat: No obvious drainage per ears or nose.  Cardiovascular: Extremities are warm and well perfused. No edema, cyanosis or pallor.  Respiratory: No audible wheezing/stridor; respirations do not appear labored.  Musculoskeletal: Normal ROM of extremities.  Skin: No obvious rashes or open sores.  Neurologic: Alert and oriented, CN 2-12 grossly intact.  Psychiatric: Answers questions appropriately with normal affect.  Hematologic/Lymphatic/Immunologic: No obvious bruises or sites of spontaneous bleeding.    Labs:  Lab Results   Component Value Date    WBC 7.7 10/21/2022    HGB 14.2 10/21/2022    HCT 41.0 10/21/2022     10/21/2022    CHOL 210 (H) 2018    TRIG 91 2018    HDL 48 2018    ALT 23 10/21/2022    AST 32 10/21/2022     10/21/2022    K 3.7 10/21/2022     10/21/2022    CREATININE 1.58 (H) 10/21/2022    BUN 18 10/21/2022    CO2 28 10/21/2022    TSH 3.28 2018    HGBA1C 6.5 (H) 2024     No results found for: \"KPSAT\", \"KPSAP\"  IMAGING:      CT abdo/pelvis without contrast  IMPRESSION:  1.  Nonobstructing bilateral renal calculi. Additional 0.3 cm  calculus within the urinary bladder at the right ureterovesical  junction. No hydroureteronephrosis.  2. Large colonic stool burden.  3. 1.2 cm left adrenal nodule consistent with an adenoma.  4. Coronary artery calcifications, recommend correlation with  cardiovascular risk factors.  Prostate measures 4.9 cm in transverse dimension    PROCEDURES:     " mL    ASSESSMENT/PLAN:  Problem List Items Addressed This Visit       BPH (benign prostatic hyperplasia)    Urinary tract infection without hematuria - Primary    Relevant Orders    POCT UA Automated manually resulted (Completed)    Measure post void residual (Completed)     He underwent prostate biopsy 10/24/2024.  PSA will be checked in 6 months.    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  He had a CT scan 10/25/2024    He has a history of BPH.  His prostate is 48.8 g.  Monitoring voiding.    He has mild urinary retention and has a prior history of retention.  His postvoid residual was 208 mL today. We discussed the role of alfuzosin and tamsulosin. Adverse effects reviewed. He will start alfuzosin 1 tablet at bedtime and follow up in 2 months.    All questions were answered to the patient’s satisfaction.  Patient agrees with the plan and wishes to proceed.  Follow-up will be scheduled appropriately.     Scribe Attestation  By signing my name below, IKalpana Scribe,   attest that this documentation has been prepared under the direction and in the presence of Jayjay Carvajal MD.

## 2024-12-05 ENCOUNTER — APPOINTMENT (OUTPATIENT)
Dept: UROLOGY | Facility: CLINIC | Age: 69
End: 2024-12-05
Payer: COMMERCIAL

## 2024-12-05 VITALS — TEMPERATURE: 97.1 F

## 2024-12-05 DIAGNOSIS — N39.0 URINARY TRACT INFECTION WITHOUT HEMATURIA, SITE UNSPECIFIED: Primary | ICD-10-CM

## 2024-12-05 DIAGNOSIS — R33.9 URINARY RETENTION: ICD-10-CM

## 2024-12-05 DIAGNOSIS — N40.1 BENIGN PROSTATIC HYPERPLASIA WITH INCOMPLETE BLADDER EMPTYING: ICD-10-CM

## 2024-12-05 DIAGNOSIS — R39.14 BENIGN PROSTATIC HYPERPLASIA WITH INCOMPLETE BLADDER EMPTYING: ICD-10-CM

## 2024-12-05 LAB
POC APPEARANCE, URINE: CLEAR
POC BILIRUBIN, URINE: NEGATIVE
POC BLOOD, URINE: NEGATIVE
POC COLOR, URINE: YELLOW
POC GLUCOSE, URINE: NEGATIVE MG/DL
POC KETONES, URINE: NEGATIVE MG/DL
POC LEUKOCYTES, URINE: NEGATIVE
POC NITRITE,URINE: NEGATIVE
POC PH, URINE: 5.5 PH
POC PROTEIN, URINE: NEGATIVE MG/DL
POC SPECIFIC GRAVITY, URINE: 1.01
POC UROBILINOGEN, URINE: 0.2 EU/DL

## 2024-12-05 PROCEDURE — G2211 COMPLEX E/M VISIT ADD ON: HCPCS | Performed by: UROLOGY

## 2024-12-05 PROCEDURE — 81003 URINALYSIS AUTO W/O SCOPE: CPT | Performed by: UROLOGY

## 2024-12-05 PROCEDURE — 99214 OFFICE O/P EST MOD 30 MIN: CPT | Performed by: UROLOGY

## 2024-12-05 PROCEDURE — 1036F TOBACCO NON-USER: CPT | Performed by: UROLOGY

## 2024-12-05 PROCEDURE — 1126F AMNT PAIN NOTED NONE PRSNT: CPT | Performed by: UROLOGY

## 2024-12-05 PROCEDURE — 1159F MED LIST DOCD IN RCRD: CPT | Performed by: UROLOGY

## 2024-12-05 RX ORDER — ALFUZOSIN HYDROCHLORIDE 10 MG/1
10 TABLET, EXTENDED RELEASE ORAL DAILY
Qty: 30 TABLET | Refills: 11 | Status: SHIPPED | OUTPATIENT
Start: 2024-12-05 | End: 2025-12-05

## 2024-12-05 ASSESSMENT — PAIN SCALES - GENERAL: PAINLEVEL_OUTOF10: 0-NO PAIN

## 2025-02-05 NOTE — PROGRESS NOTES
Patient is a 69 y.o. male presenting with urinary retention.    SUBJECTIVE:  HPI   He has history of urinary frequency. He was started on alfuzosin in December 2024. He does not notice a significant difference with his urination; however, his post void residual was improved. He has no other urinary complaints today.  He underwent prostate biopsy 10/24/2024.    He has history of Aerococcus urinary infection treated with doxycycline.  He has a prior history of stones and has had recurrent UTI.    Past Medical History:   Diagnosis Date    Cramp and spasm 06/02/2014    Muscle cramps    Other conditions influencing health status     Fracture Of The Lower Jaw (Closed)    Personal history of diseases of the skin and subcutaneous tissue 01/05/2015    History of urticaria    Personal history of other diseases of the musculoskeletal system and connective tissue 11/12/2013    History of tendinitis    Personal history of other diseases of the respiratory system     History of bronchitis     Past Surgical History:   Procedure Laterality Date    APPENDECTOMY  11/12/2013    Appendectomy    COLONOSCOPY  10/29/2015    Complete Colonoscopy      No family history on file.   Tobacco Use: Low Risk  (2/6/2025)    Patient History     Smoking Tobacco Use: Never     Smokeless Tobacco Use: Never     Passive Exposure: Not on file       Review of Systems   Constitutional: denies any unintentional weight loss or change in strength.  Integumentary: denies any rashes or pruritus.  Eyes: denies any double vision or eye pain.  Ear/Nose/Mouth/Throat: denies any nosebleeds or gum bleeds.  Cardiovascular: denies any chest pain or syncope.  Respiratory: denies hemoptysis.  Gastrointestinal: denies nausea or vomiting.  Musculoskeletal: denies muscle cramping or weakness.  Neurologic: denies convulsions or seizures.  Hematologic/Lymphatic: denies bleeding tendencies.  Endocrine: denies heat/cold intolerance.  All other systems have been reviewed and  "are negative unless otherwise noted in the HPI.    OBJECTIVE:  Visit Vitals  Temp 36.5 °C (97.7 °F)     Physical Exam   Constitutional: No obvious distress.  Eyes: Non-injected conjunctiva, sclera clear, EOMI.  Ears/Nose/Mouth/Throat: No obvious drainage per ears or nose.  Cardiovascular: Extremities are warm and well perfused. No edema, cyanosis or pallor.  Respiratory: No audible wheezing/stridor; respirations do not appear labored.  Gastrointestinal: Abdomen soft, not distended.  Musculoskeletal: Normal ROM of extremities.  Skin: No obvious rashes or open sores.  Neurologic: Alert and oriented, CN 2-12 grossly intact.  Psychiatric: Answers questions appropriately with normal affect.  Hematologic/Lymphatic/Immunologic: No obvious bruises or sites of spontaneous bleeding.  Genitourinary: No CVA tenderness, bladder not palpable.     Labs:  Lab Results   Component Value Date    GLUCOSE 114 (H) 10/21/2022    CALCIUM 9.1 10/21/2022     10/21/2022    K 3.7 10/21/2022    CO2 28 10/21/2022     10/21/2022    BUN 18 10/21/2022    CREATININE 1.58 (H) 10/21/2022     PSA  05/14/2024                 4.81  03/15/2024                 4.91  03/27/2023                 3.30    No results found for: \"URICACID\"  No results found for: \"PTH\"  No results found for: \"TESTOSTERONE\"  Urine Culture (no units)   Date Value   10/07/2024 20,000 - 80,000 Aerococcus urinae (A)      IMAGING:  === 10/25/24 ===  CT ABDOMEN PELVIS WO IV CONTRAST  - Impression -  1.  Nonobstructing bilateral renal calculi. Additional 0.3 cm  calculus within the urinary bladder at the right ureterovesical  junction. No hydroureteronephrosis.  2. Large colonic stool burden.  3. 1.2 cm left adrenal nodule consistent with an adenoma.  4. Coronary artery calcifications, recommend correlation with  cardiovascular risk factors.  Signed by: Johana Sherwood 10/27/2024 8:51 PM  Dictation workstation:   [id number]     PROCEDURES:  PVR 98 mL " 2/6/2025    ASSESSMENT/PLAN:  Problem List Items Addressed This Visit       Elevated PSA    Relevant Orders    PSA    Urinary retention - Primary    Urinary tract infection without hematuria    Relevant Orders    Measure post void residual (Completed)    POCT UA Automated manually resulted (Completed)      He has mild urinary retention and has a prior history of retention. He was initiated on alfuzosin 1 tablet at bedtime in December 2024. His postvoid residual was improved at 98 mL today 2/6/2025, from a previously 208 mL in December 2024. He will continue alfuzosin.    He has history of elevated PSA. He had a negative prostate biopsy on 10/24/2024.  His PSA was 4.81 in May 2024. He will follow up in May 2025 with PSA.    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  He had a CT scan 10/25/2024 that identified nonobstructing bilateral renal calculi measuring up to 0.3 cm. A 0.3 cm calculus is noted at the right UVJ. No hydronephrosis. His UA was negative today 2/6/2025.    He has a history of BPH.  His prostate is 48.8 g.  Monitoring voiding.    All questions were answered to the patient’s satisfaction.  Patient agrees with the plan and wishes to proceed.  Follow-up will be scheduled appropriately.     Scribe Attestation  By signing my name below, I, April Burt,   attest that this documentation has been prepared under the direction and in the presence of Jayjay Carvajal MD.

## 2025-02-06 ENCOUNTER — APPOINTMENT (OUTPATIENT)
Dept: UROLOGY | Facility: CLINIC | Age: 70
End: 2025-02-06
Payer: COMMERCIAL

## 2025-02-06 VITALS — BODY MASS INDEX: 29.95 KG/M2 | TEMPERATURE: 97.7 F | HEIGHT: 68 IN

## 2025-02-06 DIAGNOSIS — N39.0 URINARY TRACT INFECTION WITHOUT HEMATURIA, SITE UNSPECIFIED: ICD-10-CM

## 2025-02-06 DIAGNOSIS — R97.20 ELEVATED PSA: ICD-10-CM

## 2025-02-06 DIAGNOSIS — R33.9 URINARY RETENTION: Primary | ICD-10-CM

## 2025-02-06 LAB
POC APPEARANCE, URINE: CLEAR
POC BILIRUBIN, URINE: NEGATIVE
POC BLOOD, URINE: NEGATIVE
POC COLOR, URINE: YELLOW
POC GLUCOSE, URINE: NEGATIVE MG/DL
POC KETONES, URINE: NEGATIVE MG/DL
POC LEUKOCYTES, URINE: NEGATIVE
POC NITRITE,URINE: NEGATIVE
POC PH, URINE: 5.5 PH
POC PROTEIN, URINE: NEGATIVE MG/DL
POC SPECIFIC GRAVITY, URINE: 1.02
POC UROBILINOGEN, URINE: 1 EU/DL

## 2025-02-06 PROCEDURE — 1036F TOBACCO NON-USER: CPT | Performed by: UROLOGY

## 2025-02-06 PROCEDURE — 1159F MED LIST DOCD IN RCRD: CPT | Performed by: UROLOGY

## 2025-02-06 PROCEDURE — 99214 OFFICE O/P EST MOD 30 MIN: CPT | Performed by: UROLOGY

## 2025-02-06 PROCEDURE — 1160F RVW MEDS BY RX/DR IN RCRD: CPT | Performed by: UROLOGY

## 2025-02-06 PROCEDURE — G2211 COMPLEX E/M VISIT ADD ON: HCPCS | Performed by: UROLOGY

## 2025-02-06 PROCEDURE — 81003 URINALYSIS AUTO W/O SCOPE: CPT | Performed by: UROLOGY

## 2025-02-06 PROCEDURE — 1126F AMNT PAIN NOTED NONE PRSNT: CPT | Performed by: UROLOGY

## 2025-02-06 ASSESSMENT — PAIN SCALES - GENERAL: PAINLEVEL_OUTOF10: 0-NO PAIN

## 2025-04-21 DIAGNOSIS — R97.20 ELEVATED PSA: ICD-10-CM

## 2025-05-14 NOTE — PROGRESS NOTES
Patient is a 69 y.o. male presenting for follow up with [facility] of elevated PSA, BPH, and urinary retention.    SUBJECTIVE:  HPI   He presents for 3-month followup  He denies signs of UTI.  He has history of urinary frequency. He was started on alfuzosin in December 2024. He does not notice a significant difference with his urination; however, his post void residual was improved. He has no other urinary complaints today.  He underwent prostate biopsy 10/24/2024.    He has history of Aerococcus urinary infection treated with doxycycline.  He has a prior history of stones and has had recurrent UTI.    Medical History[1]  Surgical History[2]  Family History[3]  Social History[4]    Review of Systems   All systems have been reviewed and are negative unless otherwise noted in the HPI.    OBJECTIVE:  There were no vitals taken for this visit.    Physical Exam   Constitutional: No obvious distress.  Eyes: Non-injected conjunctiva, sclera clear, EOMI.  Ears/Nose/Mouth/Throat: No obvious drainage per ears or nose.  Cardiovascular: Extremities are warm and well perfused. No edema, cyanosis or pallor.  Respiratory: No audible wheezing/stridor; respirations do not appear labored.  Gastrointestinal: Abdomen soft, not distended.  Musculoskeletal: Normal ROM of extremities.  Skin: No obvious rashes or open sores.  Neurologic: Alert and oriented, CN 2-12 grossly intact.  Psychiatric: Answers questions appropriately with normal affect.  Hematologic/Lymphatic/Immunologic: No obvious bruises or sites of spontaneous bleeding.  Genitourinary: No CVA tenderness, bladder not palpable.     Labs:  Lab Results   Component Value Date    WBC 7.7 10/21/2022    HGB 14.2 10/21/2022    HCT 41.0 10/21/2022    MCV 91 10/21/2022     10/21/2022    GLUCOSE 114 (H) 10/21/2022    CALCIUM 9.1 10/21/2022     10/21/2022    K 3.7 10/21/2022    CO2 28 10/21/2022     10/21/2022    BUN 18 10/21/2022    CREATININE 1.58 (H) 10/21/2022    EGFR 80  12/28/2018    URINECULTURE 20,000 - 80,000 Aerococcus urinae (A) 10/07/2024     IMAGING:  === 05/20/24 ===  MR PROSTATE WITH LIONEL BOUNDARIES  - Impression -  BPH changes of the transition zone. Diffuse non nodular  hypointensities within the peripheral zone, without evidence of  focally restricted diffusion ( PI-RADS 2).  Prostate weight is estimated at 48.8g. PSA density is 0.10 ng/mL/g.     === 10/25/24 ===  CT ABDOMEN PELVIS WO IV CONTRAST  - Impression -  1.  Nonobstructing bilateral renal calculi. Additional 0.3 cm  calculus within the urinary bladder at the right ureterovesical  junction. No hydroureteronephrosis.  2. Large colonic stool burden.  3. 1.2 cm left adrenal nodule consistent with an adenoma.  4. Coronary artery calcifications, recommend correlation with  cardiovascular risk factors.  Signed by: Johana Sherwood 10/27/2024 8:51 PM  Dictation workstation:   [id number]     PROCEDURES:  Bladder scan: PVR 83 mL  5/15/25    ASSESSMENT/PLAN:  Problem List Items Addressed This Visit    None  Visit Diagnoses         Elevated prostate specific antigen (PSA)    -  Primary    Relevant Orders    PSA      Urinary symptom or sign        Relevant Orders    Measure post void residual (Completed)    POCT UA Automated manually resulted (Completed)      Kidney stones        Relevant Orders    XR abdomen 1 view          He has a history of BPH.  His prostate is 48.8 g.  Monitoring voiding.    He has history of urinary retention, treated with alfuzosin. His residual today is improved at 83 mL for prior 98 mL. Monitor residual.      He has history of elevated PSA. He had a negative prostate biopsy on 10/24/2024. MRI was negative 4/24 (PI-RADS 2). He will follow up in 6 months with PSA prior..  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    Urinalysis was  negative today.    He has history of kidney stones. CT scan 10/25/24 identified nonobstructing bilateral renal calculi measuring up to 0.3 cm. No hydronephrosis. KUB now.    All questions were answered to the patient’s satisfaction.  Patient agrees with the plan and wishes to proceed.  Follow-up will be scheduled appropriately.     Scribe Attestation  By signing my name below, I, April Burt,   attest that this documentation has been prepared under the direction and in the presence of Jayjay Carvajal MD.         [1]   Past Medical History:  Diagnosis Date    Cramp and spasm 06/02/2014    Muscle cramps    Other conditions influencing health status     Fracture Of The Lower Jaw (Closed)    Personal history of diseases of the skin and subcutaneous tissue 01/05/2015    History of urticaria    Personal history of other diseases of the musculoskeletal system and connective tissue 11/12/2013    History of tendinitis    Personal history of other diseases of the respiratory system     History of bronchitis   [2]   Past Surgical History:  Procedure Laterality Date    APPENDECTOMY  11/12/2013    Appendectomy    COLONOSCOPY  10/29/2015    Complete Colonoscopy   [3] No family history on file.  [4]   Social History  Tobacco Use    Smoking status: Never    Smokeless tobacco: Never

## 2025-05-15 ENCOUNTER — APPOINTMENT (OUTPATIENT)
Dept: UROLOGY | Facility: CLINIC | Age: 70
End: 2025-05-15
Payer: COMMERCIAL

## 2025-05-15 DIAGNOSIS — N20.0 KIDNEY STONE: ICD-10-CM

## 2025-05-15 DIAGNOSIS — R39.9 URINARY SYMPTOM OR SIGN: ICD-10-CM

## 2025-05-15 DIAGNOSIS — N20.0 KIDNEY STONES: ICD-10-CM

## 2025-05-15 DIAGNOSIS — R97.20 ELEVATED PROSTATE SPECIFIC ANTIGEN (PSA): Primary | ICD-10-CM

## 2025-05-15 DIAGNOSIS — R33.9 URINARY RETENTION: ICD-10-CM

## 2025-05-15 LAB
POC APPEARANCE, URINE: CLEAR
POC BILIRUBIN, URINE: NEGATIVE
POC BLOOD, URINE: NEGATIVE
POC COLOR, URINE: YELLOW
POC GLUCOSE, URINE: NEGATIVE MG/DL
POC KETONES, URINE: NEGATIVE MG/DL
POC LEUKOCYTES, URINE: NEGATIVE
POC NITRITE,URINE: NEGATIVE
POC PH, URINE: 6 PH
POC PROTEIN, URINE: NEGATIVE MG/DL
POC SPECIFIC GRAVITY, URINE: 1.02
POC UROBILINOGEN, URINE: 0.2 EU/DL

## 2025-05-15 PROCEDURE — 99214 OFFICE O/P EST MOD 30 MIN: CPT | Performed by: UROLOGY

## 2025-05-15 PROCEDURE — 1036F TOBACCO NON-USER: CPT | Performed by: UROLOGY

## 2025-05-15 PROCEDURE — 1160F RVW MEDS BY RX/DR IN RCRD: CPT | Performed by: UROLOGY

## 2025-05-15 PROCEDURE — 1126F AMNT PAIN NOTED NONE PRSNT: CPT | Performed by: UROLOGY

## 2025-05-15 PROCEDURE — 81003 URINALYSIS AUTO W/O SCOPE: CPT | Performed by: UROLOGY

## 2025-05-15 PROCEDURE — 1159F MED LIST DOCD IN RCRD: CPT | Performed by: UROLOGY

## 2025-05-15 ASSESSMENT — PAIN SCALES - GENERAL: PAINLEVEL_OUTOF10: 0-NO PAIN

## 2025-11-20 ENCOUNTER — APPOINTMENT (OUTPATIENT)
Dept: UROLOGY | Facility: CLINIC | Age: 70
End: 2025-11-20
Payer: COMMERCIAL